# Patient Record
Sex: FEMALE | Race: BLACK OR AFRICAN AMERICAN
[De-identification: names, ages, dates, MRNs, and addresses within clinical notes are randomized per-mention and may not be internally consistent; named-entity substitution may affect disease eponyms.]

---

## 2017-01-26 NOTE — RAD
CT of the head without contrast, 1/26/2017:



History: Panic attack, hallucinations, possible seizure



The ventricles are within normal limits in size. There is no shift of the

midline structures. There is no evidence of acute intracranial hemorrhage or

mass effect.



A small amount of fluid is evident in the left maxillary sinus, presumably on

an inflammatory basis.



IMPRESSION:

1. No acute intracranial abnormality is detected.

2. Left maxillary sinusitis

## 2017-01-26 NOTE — PHYS DOC
Past Medical History


Past Medical History:  Anxiety


Past Surgical History:  Tubal ligation


Alcohol Use:  None


Drug Use:  Marijuana





Adult General


Chief Complaint


Chief Complaint:  ANXIETY/PANIC ATTACK





HPI


HPI


31-year-old female presents with ongoing anxiety as well as chills and possible 

hallucinations per family. She currently denies any hallucinations. She is 

fully alert and oriented and able to follow my commands. She does states she 

was seen at the [place] [facility] several weeks ago and had an extensive 

workup including a CT of her abdomen and pelvis. She was told she had an 

infection at that time but did not follow-up or obtain any prescription for 

antibiotics. She currently denies any chest pain or shortness of breath or 

dysuria. She denies any nausea or vomiting. She states she does not have any 

known health problems. She denies any illicit drug use. She states her last 

menstrual period was approximately 2 weeks ago and normal.





Review of Systems


Review of Systems





Constitutional: Denies fever or chills []


Eyes: Denies change in visual acuity, redness, or eye pain []


HENT: Denies nasal congestion or sore throat []


Respiratory: Denies cough or shortness of breath []


Cardiovascular: No additional information not addressed in HPI []


GI: Denies abdominal pain, nausea, vomiting, bloody stools or diarrhea []


: Denies dysuria or hematuria []


Musculoskeletal: Denies back pain or joint pain []


Integument: Denies rash or skin lesions []


Neurologic: Denies headache, focal weakness or sensory changes []


Endocrine: Denies polyuria or polydipsia []





Current Medications


Current Medications





 Current Medications








 Medications


  (Trade)  Dose


 Ordered  Sig/Clement  Start Time


 Stop Time Status Last Admin


Dose Admin


 


 Lorazepam


  (Ativan)  1 mg  1X  ONCE  1/26/17 16:30


 1/26/17 16:31 DC 1/26/17 16:30


1 MG


 


 Ondansetron HCl


  (Zofran)  4 mg  STK-MED ONCE  1/26/17 16:34


 1/26/17 16:35 DC  


 











Allergies


Allergies





 Allergies








Coded Allergies Type Severity Reaction Last Updated Verified


 


  No Known Drug Allergies    1/26/17 No











Physical Exam


Physical Exam





Constitutional: Well developed, well nourished, no acute distress, non-toxic 

appearance. []


HENT: Normocephalic, atraumatic, bilateral external ears normal, oropharynx 

moist, no oral exudates, nose normal. []


Eyes: PERRLA, EOMI, conjunctiva normal, no discharge. [] 


Neck: Normal range of motion, no tenderness, supple, no stridor. [] 


Cardiovascular:Heart rate regular rhythm, no murmur []


Lungs & Thorax:  Bilateral breath sounds clear to auscultation []


Abdomen: Bowel sounds normal, soft, no tenderness, no masses, no pulsatile 

masses. [] 


Skin: Warm, dry, no erythema, no rash. [] 


Back: No tenderness, no CVA tenderness. [] 


Extremities: No tenderness, no cyanosis, no clubbing, ROM intact, no edema. [] 


Neurologic: Alert and oriented X 3, normal motor function, normal sensory 

function, no focal deficits noted. []


Psychologic: Affect normal, judgement normal, mood normal. []





Current Patient Data


Vital Signs





 Vital Signs








  Date Time  Temp Pulse Resp B/P Pulse Ox O2 Delivery O2 Flow Rate FiO2


 


1/26/17 18:24  86  134/86 99   


 


1/26/17 15:21 98.2  14   Room Air  





 98.2       








Lab Values





 Laboratory Tests








Test


  1/26/17


15:05 1/26/17


15:25


 


Urine Collection Type Unknown   


 


Urine Color Yellow   


 


Urine Clarity Clear   


 


Urine pH 6.0   


 


Urine Specific Gravity 1.025   


 


Urine Protein


  Negativemg/dL


(NEG-TRACE) 


 


 


Urine Glucose (UA)


  Negativemg/dL


(NEG) 


 


 


Urine Ketones (Stick)


  Tracemg/dL


(NEG) 


 


 


Urine Blood


  Negative (NEG)


  


 


 


Urine Nitrite


  Negative (NEG)


  


 


 


Urine Bilirubin


  Negative (NEG)


  


 


 


Urine Urobilinogen Dipstick


  0.2mg/dL (0.2


mg/dL) 


 


 


Urine Leukocyte Esterase


  Negative (NEG)


  


 


 


Urine RBC 0/HPF (0-2)   


 


Urine WBC 0/HPF (0-4)   


 


Urine Squamous Epithelial


Cells Mod/LPF  


  


 


 


Urine Bacteria


  Few/HPF


(0-FEW) 


 


 


Urine Mucus Mod/LPF   


 


Urine Pregnancy Test


  Negative (NEG)


  


 


 


Urine Opiates Screen Neg (NEG)   


 


Urine Methadone Screen Neg (NEG)   


 


Urine Barbiturates Neg (NEG)   


 


Urine Phencyclidine Screen Neg (NEG)   


 


Urine


Amphetamine/Methamphetamine Neg (NEG)  


  


 


 


Urine Benzodiazepines Screen Neg (NEG)   


 


Urine Cocaine Screen Neg (NEG)   


 


Urine Cannabinoids Screen Neg (NEG)   


 


Urine Ethyl Alcohol Neg (NEG)   


 


White Blood Count


  


  5.7x10^3/uL


(4.0-11.0)


 


Red Blood Count


  


  5.02x10^6/uL


(3.50-5.40)


 


Hemoglobin


  


  11.0g/dL


(12.0-15.5)  L


 


Hematocrit


  


  34.8%


(36.0-47.0)  L


 


Mean Corpuscular Volume


  


  69fL ()


L


 


Mean Corpuscular Hemoglobin  22pg (25-35)  L


 


Mean Corpuscular Hemoglobin


Concent 


  32g/dL (31-37)


 


 


Red Cell Distribution Width


  


  18.2%


(11.5-14.5)  H


 


Platelet Count


  


  388x10^3/uL


(140-400)


 


Neutrophils (%) (Auto)  59% (31-73)  


 


Lymphocytes (%) (Auto)  33% (24-48)  


 


Monocytes (%) (Auto)  7% (0-9)  


 


Eosinophils (%) (Auto)  0% (0-3)  


 


Basophils (%) (Auto)  1% (0-3)  


 


Neutrophils # (Auto)


  


  3.3x10^3uL


(1.8-7.7)


 


Lymphocytes # (Auto)


  


  1.9x10^3/uL


(1.0-4.8)


 


Monocytes # (Auto)


  


  0.4x10^3/uL


(0.0-1.1)


 


Eosinophils # (Auto)


  


  0.0x10^3/uL


(0.0-0.7)


 


Basophils # (Auto)


  


  0.1x10^3/uL


(0.0-0.2)


 


Platelet Estimate


  


  Adequate


(ADEQUATE)


 


Giant Platelets  Present  


 


Hypochromasia  Mod  


 


Poikilocytosis  Slight  


 


Anisocytosis  Slight  


 


Microcytosis  Marked  


 


Target Cells  Occ  


 


Ovalocytes  Few  


 


Sodium Level


  


  134mmol/L


(136-145)  L


 


Potassium Level


  


  3.7mmol/L


(3.5-5.1)


 


Chloride Level


  


  102mmol/L


()


 


Carbon Dioxide Level


  


  23mmol/L


(21-32)


 


Anion Gap  9 (6-14)  


 


Blood Urea Nitrogen  7mg/dL (7-20)  


 


Creatinine


  


  0.7mg/dL


(0.6-1.0)


 


Estimated GFR


(Cockcroft-Gault) 


  116.6  


 


 


Glucose Level


  


  102mg/dL


(70-99)  H


 


Calcium Level


  


  9.5mg/dL


(8.5-10.1)





 Laboratory Tests


1/26/17 15:25








 Laboratory Tests


1/26/17 15:25














EKG


EKG


EKG as interpreted by me shows sinus rhythm with a rate of 66 bpm. There are no 

acute ischemic findings seen.





Radiology/Procedures


Radiology/Procedures


CT head without contrast demonstrated the following:


History: Panic attack, hallucinations, possible seizure





The ventricles are within normal limits in size. There is no shift of the


midline structures. There is no evidence of acute intracranial hemorrhage or


mass effect.





A small amount of fluid is evident in the left maxillary sinus, presumably on


an inflammatory basis.





Course & Med Decision Making


Course & Med Decision Making


Pertinent Labs and Imaging studies reviewed. (See chart for details)





This 30-year-old female who is having ongoing anxiety and concern for psychotic 

symptoms has a negative workup at this time for any acute cause. Her head CT 

was also ordered and was negative. Patient was given a dose of IV Ativan and 

some Zofran for nausea. I am currently awaiting family to discuss a treatment 

plan for her. I also discussed the case with Dr. Greer the hospitalist who 

agreed that the patient could be brought in under observation care for her 

ongoing hallucinatory symptoms if she is not comfortable going home.





Upon my reassessment, the patient feels improved after Ativan dose. An 

observation stay for her ongoing anxiety symptoms was offered but was declined. 

She continues to deny any suicidal or homicidal ideation. I discussed the need 

for the patient to obtain follow-up and resources were provided to [facility] as well as multiple family practice clinics. Family at bedside 

states they will be very good about her getting follow-up in the next few days 

for her symptoms. A prescription for a few days of Ativan was also provided. She

'll be discharged without incident.





Dragon Disclaimer


Dragon Disclaimer


This electronic medical record was generated, in whole or in part, using a 

voice recognition dictation system.





Departure


Departure


Impression:  


 Primary Impression:  


 Anxiety


Disposition:  01 HOME, SELF-CARE


Admitting Physician:  Other


Referrals:  


NO PCP (PCP)








JOE GALVAN MD


Patient Instructions:  Anxiety and Panic Attacks, Easy-to-Read





Additional Instructions:


Please take your medication as prescribed and utilize the resources provided. 

Follow up with your primary doctor as discussed in the next 1-2 days. Return to 

the ER if you develop any worsening of your symptoms.


Scripts


Ondansetron Hcl (Zofran)4 Mg Tablet1 Tab PO Q8HRS #10 TAB


   Prov:LUDIN DAWN DO         1/26/17


Lorazepam (Ativan)1 Mg Tablet1 Mg PO BID #6 TAB


   Prov:LUDIN DAWN DO         1/26/17








LUDIN DAWN DO Jan 26, 2017 17:20

## 2017-01-26 NOTE — EKG
[facility]

              [address] [place] [address]

Test Date:    2017               Test Time:    15:13:10

Pat Name:     BREANNA SALOMON             Department:   

Patient ID:   [id number]           Room:          

Gender:       F                        Technician:   

:          [id number]               Requested By: LUDIN DAWN

Order Number: [id number]            Reading MD:   Sandor Holley

                                 Measurements

Intervals                              Axis          

Rate:         66                       P:            -20

WY:           158                      QRS:          25

QRSD:         78                       T:            20

QT:           390                                    

QTc:          411                                    

                           Interpretive Statements

SINUS RHYTHM



Electronically Signed On 2017 10:07:20 CST by Sandor Holley

## 2017-02-09 NOTE — PHYS DOC
Past Medical History


Past Medical History:  Anxiety


Past Surgical History:  Tubal ligation


Additional Past Surgical Histo:  hernia repair


Alcohol Use:  None


Drug Use:  Marijuana





Adult General


Chief Complaint


Chief Complaint:  ANXIETY/PANIC ATTACK





HPI


HPI


Patient is a 31  year old female presents emergency room via EMS today with 2 

complaints: 1. Anxiety which causes her to have feelings of dread and fear. 

Patient reports that she's had problems with anxiety for several years. She was 

seen here January 26 for the same complaint. She has a has an appointment at 

the end of this month with [facility]. Patient states that she cannot 

wait that long. Patient was prescribed Ativan upon her discharge from here. She 

states that the Ativan did very little to control her anxiety. Patient denies 

suicidal or homicidal ideation. Patient states that she is not being verbally, 

mostly or physically abused in her home.


2. Patient has an additional complaint of nausea, vomiting and diarrhea that 

began earlier today. Patient reports that she's thrown up 3 times and had 4 

loose bowel movements today. She denies any bilious or black emesis. She denies 

any black or bloody bowel movements. Patient denies any one in the home with 

similar symptoms. She denies hospitalization, foreign travel or antibiotic use 

within the past 90 days. Patient denies any history of gastrointestinal 

diseases. She's had a tubal ligation and abdominal wall hernia repair performed 

in the past. She denies any history of bowel obstructions. Last by mouth intake 

was approximately 10 or 11 PM last night. Last bowel movement was approximately 

2 hours prior to arrival to the hospital.





Patient reports chills at home. She denies fevers. She also reports that she's 

had a nonproductive cough for 3 days. Patient is a smoker. She denies illicit 

drug use.





Review of Systems


Review of Systems





Constitutional: Denies fever or chills []


Eyes: Denies change in visual acuity, redness, or eye pain []


HENT: Denies nasal congestion or sore throat []


Respiratory: Denies cough or shortness of breath []


Cardiovascular: No additional information not addressed in HPI []


GI: Denies abdominal pain, nausea, vomiting, bloody stools or diarrhea []


: Denies dysuria or hematuria []


Musculoskeletal: Denies back pain or joint pain []


Integument: Denies rash or skin lesions []


Neurologic: Denies headache, focal weakness or sensory changes []


Endocrine: Denies polyuria or polydipsia []





Current Medications


Current Medications





 Current Medications








 Medications


  (Trade)  Dose


 Ordered  Sig/Clement  Start Time


 Stop Time Status Last Admin


Dose Admin


 


 Diphenhydramine


 HCl


  (Benadryl)  25 mg  1X  ONCE  2/9/17 18:15


 2/9/17 18:18 DC 2/9/17 19:21


25 MG


 


 Haloperidol


 Lactate


  (Haldol)  5 mg  1X  ONCE  2/9/17 18:15


 2/9/17 18:18 DC 2/9/17 19:22


5 MG


 


 Metoclopramide HCl


  (Reglan)  10 mg  1X  ONCE  2/9/17 18:15


 2/9/17 18:18 DC 2/9/17 19:19


10 MG


 


 Sodium Chloride


  (Iv Sodium


 Chloride 0.9%


 1000ml Bag)  1,000 ml @ 


 1,000 mls/hr  Q1H  2/9/17 18:13


 2/9/17 19:12 DC 2/9/17 19:19


1,000 MLS/HR











Allergies


Allergies





 Allergies








Coded Allergies Type Severity Reaction Last Updated Verified


 


  No Known Drug Allergies    1/26/17 No











Physical Exam


Physical Exam





Constitutional: Well developed, well nourished, moderate distress, non-toxic 

appearance. Patient is tearful. Patient states that she is concerned that she 

may pass out.


HENT: Normocephalic, atraumatic, bilateral external ears normal, oropharynx 

moist, no oral exudates, nose normal. []


Eyes: PERRLA, EOMI, conjunctiva normal, no discharge. [] 


Neck: Normal range of motion, no tenderness, supple, no stridor. There is no 

meningismus. There is bilateral anterior and posterior cervical 

lymphadenopathy. 


Cardiovascular:Heart rate 104 with regular rhythm, no murmur 


Lungs & Thorax:  Respiratory distress or respiratory fatigue. Patient is not 

tachypneic or hypoxic. Patient is able to speak in full sentences. Lung sounds 

are clear to auscultation bilaterally.


Abdomen: Abdomen is soft and nondistended. There are hyperactive bowel sounds 

in all 4 quadrants. There is no focal tenderness to the abdomen or palpable 

defects the abdominal wall. There are no pulsatile masses. There is no rebound 

or guarding.


Skin: Warm, dry, no erythema, no rash. 


Back: No tenderness, no CVA tenderness. [] 


Extremities: No tenderness, no cyanosis, no clubbing, ROM intact, no edema. [] 


Neurologic: Alert and oriented X 3, normal motor function, normal sensory 

function, no focal deficits noted. 


Psychologic: Patient is anxious. I was able to redirect the patient by 

reminding her that she was in a safe place and there was no immediate threat to 

life, limb her eyesight.





Current Patient Data


Vital Signs





 Vital Signs








  Date Time  Temp Pulse Resp B/P Pulse Ox O2 Delivery O2 Flow Rate FiO2


 


2/9/17 17:54 97.3 71 16 127/60 97 Room Air  





 97.3       








Lab Values





 Laboratory Tests








Test


  2/9/17


17:50 2/9/17


19:12


 


Urine Collection Type Clean catch   


 


Urine Color Yellow   


 


Urine Clarity Clear   


 


Urine pH 6.0   


 


Urine Specific Gravity 1.010   


 


Urine Protein


  Negativemg/dL


(NEG-TRACE) 


 


 


Urine Glucose (UA)


  Negativemg/dL


(NEG) 


 


 


Urine Ketones (Stick)


  Negativemg/dL


(NEG) 


 


 


Urine Blood


  Negative (NEG)


  


 


 


Urine Nitrite


  Negative (NEG)


  


 


 


Urine Bilirubin


  Negative (NEG)


  


 


 


Urine Urobilinogen Dipstick


  0.2mg/dL (0.2


mg/dL) 


 


 


Urine Leukocyte Esterase


  Negative (NEG)


  


 


 


Urine RBC 0/HPF (0-2)   


 


Urine WBC 0/HPF (0-4)   


 


Urine Squamous Epithelial


Cells Few/LPF  


  


 


 


Urine Bacteria 0/HPF (0-FEW)   


 


Urine Mucus Mod/LPF   


 


Urine Opiates Screen Neg (NEG)   


 


Urine Methadone Screen Neg (NEG)   


 


Urine Barbiturates Neg (NEG)   


 


Urine Phencyclidine Screen Neg (NEG)   


 


Urine


Amphetamine/Methamphetamine Neg (NEG)  


  


 


 


Urine Benzodiazepines Screen Neg (NEG)   


 


Urine Cocaine Screen Neg (NEG)   


 


Urine Cannabinoids Screen Neg (NEG)   


 


Urine Ethyl Alcohol Neg (NEG)   


 


White Blood Count


  


  6.9x10^3/uL


(4.0-11.0)


 


Red Blood Count


  


  5.00x10^6/uL


(3.50-5.40)


 


Hemoglobin


  


  11.0g/dL


(12.0-15.5)  L


 


Hematocrit


  


  34.8%


(36.0-47.0)  L


 


Mean Corpuscular Volume


  


  70fL ()


L


 


Mean Corpuscular Hemoglobin  22pg (25-35)  L


 


Mean Corpuscular Hemoglobin


Concent 


  32g/dL (31-37)


 


 


Red Cell Distribution Width


  


  18.7%


(11.5-14.5)  H


 


Platelet Count


  


  352x10^3/uL


(140-400)


 


Neutrophils (%) (Auto)  72% (31-73)  


 


Lymphocytes (%) (Auto)  23% (24-48)  L


 


Monocytes (%) (Auto)  3% (0-9)  


 


Eosinophils (%) (Auto)  0% (0-3)  


 


Basophils (%) (Auto)  1% (0-3)  


 


Neutrophils # (Auto)


  


  5.0x10^3uL


(1.8-7.7)


 


Lymphocytes # (Auto)


  


  1.6x10^3/uL


(1.0-4.8)


 


Monocytes # (Auto)


  


  0.2x10^3/uL


(0.0-1.1)


 


Eosinophils # (Auto)


  


  0.0x10^3/uL


(0.0-0.7)


 


Basophils # (Auto)


  


  0.1x10^3/uL


(0.0-0.2)


 


Platelet Estimate  Pending  


 


Sodium Level


  


  140mmol/L


(136-145)


 


Potassium Level


  


  3.6mmol/L


(3.5-5.1)


 


Chloride Level


  


  104mmol/L


()


 


Carbon Dioxide Level


  


  26mmol/L


(21-32)


 


Anion Gap  10 (6-14)  


 


Blood Urea Nitrogen


  


  5mg/dL (7-20)


L


 


Creatinine


  


  0.7mg/dL


(0.6-1.0)


 


Estimated GFR


(Cockcroft-Gault) 


  116.6  


 


 


BUN/Creatinine Ratio  7 (6-20)  


 


Glucose Level


  


  99mg/dL


(70-99)


 


Calcium Level


  


  9.8mg/dL


(8.5-10.1)


 


Total Bilirubin


  


  0.3mg/dL


(0.2-1.0)


 


Aspartate Amino Transferase


(AST) 


  20U/L (15-37)  


 


 


Alanine Aminotransferase (ALT)  16U/L (14-59)  


 


Alkaline Phosphatase


  


  78U/L ()


 


 


Total Protein


  


  8.8g/dL


(6.4-8.2)  H


 


Albumin


  


  4.0g/dL


(3.4-5.0)


 


Albumin/Globulin Ratio


  


  0.8 (1.0-1.7)


L


 


Lipase


  


  107U/L


()





 Laboratory Tests


2/9/17 19:12








 Laboratory Tests


2/9/17 19:12











EKG


EKG


[]





Radiology/Procedures


Radiology/Procedures


[]





Course & Med Decision Making


Course & Med Decision Making


Pertinent Labs and Imaging studies reviewed. (See chart for details)





[]





Dragon Disclaimer


Dragon Disclaimer


This electronic medical record was generated, in whole or in part, using a 

voice recognition dictation system.





Departure


Departure


Impression:  


 Primary Impression:  


 Anxiety


 Additional Impression:  


 Gastroenteritis


Disposition:  01 HOME, SELF-CARE


Condition:  IMPROVED


Referrals:  


NO PCP (PCP)


Patient Instructions:  Anxiety and Panic Attacks, Easy-to-Read, Viral 

Gastroenteritis, Easy-to-Read





Additional Instructions:


1. Your laboratory tests today are normal.


2. Take the medication as prescribed.


3. Review the discharge instructions for home/self-care and reasons to return 

the emergency department.


4. Be sure you make your appointment with [facility] and also 

establish a new primary care doctor. You can use the pamphlet provided for this.


Scripts


Ondansetron (Zofran Odt)4 Mg Tab.rapdis1 Tab SL Q8HRS #15 TAB


   Prov:LUANA DALE         2/9/17


Hydroxyzine Hcl 25 Mg Tablet1 Tab PO TID ANXIETY #30 TAB


   Prov:LUANA DALE         2/9/17





Problem Qualifiers








LUANA DALE Feb 9, 2017 18:28

## 2017-11-25 ENCOUNTER — HOSPITAL ENCOUNTER (EMERGENCY)
Dept: HOSPITAL 61 - ER | Age: 32
Discharge: HOME | End: 2017-11-25
Payer: SELF-PAY

## 2017-11-25 VITALS — DIASTOLIC BLOOD PRESSURE: 64 MMHG | SYSTOLIC BLOOD PRESSURE: 119 MMHG

## 2017-11-25 VITALS — BODY MASS INDEX: 39.27 KG/M2 | HEIGHT: 60 IN | WEIGHT: 200 LBS

## 2017-11-25 DIAGNOSIS — N64.4: Primary | ICD-10-CM

## 2017-11-25 PROCEDURE — 99283 EMERGENCY DEPT VISIT LOW MDM: CPT

## 2017-11-25 NOTE — PHYS DOC
Past Medical History


Past Medical History:  Anxiety


Past Surgical History:  Tubal ligation


Additional Past Surgical Histo:  hernia repair


Alcohol Use:  None


Drug Use:  Marijuana





Adult General


Chief Complaint


Chief Complaint:  BREAST PROBLEM





HPI


HPI





Patient is a 32  year old female presents to the ED complaining of right breast 

pain x 3 weeks. Describes as achy. Rates as 6/10. States same symptoms a few 

months ago that resolved without intervention. States pain improved with 

tramadol. No history of breast cancer in family. States she has never had a 

mammogram. Denies breast skin changes, nipple discharge, weakness, chest pain, 

shortness of breath, dizziness, fever or masses.





Review of Systems


Review of Systems





Constitutional: Denies fever or chills []


Eyes: Denies change in visual acuity, redness, or eye pain []


HENT: Denies nasal congestion or sore throat []


Respiratory: Denies cough or shortness of breath []


Cardiovascular: No additional information not addressed in HPI []


GI: Denies abdominal pain, nausea, vomiting, bloody stools or diarrhea []


: Denies dysuria or hematuria []


Musculoskeletal: Denies back pain or joint pain []


Integument: Denies rash or skin lesions []


Neurologic: Denies headache, focal weakness or sensory changes []


Endocrine: Denies polyuria or polydipsia []





All other systems were reviewed and found to be within normal limits, except as 

documented in this note.





Allergies


Allergies





Allergies








Coded Allergies Type Severity Reaction Last Updated Verified


 


  No Known Drug Allergies    1/26/17 No











Physical Exam


Physical Exam





Constitutional: Well developed, well nourished, no acute distress, non-toxic 

appearance. []


HENT: Normocephalic, atraumatic, bilateral external ears normal, oropharynx 

moist, no oral exudates, nose normal. []


Eyes: PERRLA, EOMI, conjunctiva normal, no discharge. [] 


Neck: Normal range of motion, no tenderness, supple, no stridor. [] 


Cardiovascular:Heart rate regular rhythm, no murmur []


Lungs & Thorax:  Bilateral breath sounds clear to auscultation [] NORMAL BREAST 

EXAM. NO OVERLYING SKIN CHANGES, NIPPLE DISCHARGE, LUMPS OR MASSES. 


Abdomen: Bowel sounds normal, soft, no tenderness, no masses, no pulsatile 

masses. [] 


Skin: Warm, dry, no erythema, no rash. [] 


Back: No tenderness, no CVA tenderness. [] 


Extremities: No tenderness, no cyanosis, no clubbing, ROM intact, no edema. [] 


Neurologic: Alert and oriented X 3, normal motor function, normal sensory 

function, no focal deficits noted. []


Psychologic: Affect normal, judgement normal, mood normal. []





Current Patient Data


Vital Signs





 Vital Signs








  Date Time  Temp Pulse Resp B/P (MAP) Pulse Ox O2 Delivery O2 Flow Rate FiO2


 


11/25/17 17:15 98.1 95 16  99 Room Air  





 98.1       











EKG


EKG


[]





Radiology/Procedures


Radiology/Procedures


[]





Course & Med Decision Making


Course & Med Decision Making


Pertinent Labs and Imaging studies reviewed. (See chart for details)





[]Normal breast exam. Overlying skin changes. Provided community resource list 

for follow-up mammogram and evaluation. Discussed reasons to return to the ED. 

Patient understands and agrees with plan.





Dragon Disclaimer


Dragon Disclaimer


This electronic medical record was generated, in whole or in part, using a 

voice recognition dictation system.





Departure


Departure


Impression:  


 Primary Impression:  


 Breast pain


Disposition:  01 HOME, SELF-CARE


Condition:  STABLE


Referrals:  


NO PCP (PCP)








SHELL SPRAGUE Jr, MD


Patient Instructions:  Breast Self-Exam, Easy-to-Read, Breast Tenderness


Scripts


Tramadol Hcl (TRAMADOL HCL) 50 Mg Tablet


1 TAB PO PRN Q6HRS, #12 TAB


   Prov: DERRICK BENTON         11/25/17











DERRICK BENTON Nov 25, 2017 17:25

## 2018-01-29 ENCOUNTER — HOSPITAL ENCOUNTER (EMERGENCY)
Dept: HOSPITAL 61 - ER | Age: 33
Discharge: HOME | End: 2018-01-29
Payer: SELF-PAY

## 2018-01-29 DIAGNOSIS — J45.909: ICD-10-CM

## 2018-01-29 DIAGNOSIS — M79.1: ICD-10-CM

## 2018-01-29 DIAGNOSIS — F12.10: ICD-10-CM

## 2018-01-29 DIAGNOSIS — R05: ICD-10-CM

## 2018-01-29 DIAGNOSIS — N64.4: Primary | ICD-10-CM

## 2018-01-29 DIAGNOSIS — R51: ICD-10-CM

## 2018-01-29 LAB
AMORPHOUS SEDIMENT,UR: PRESENT /HPF
BACTERIA,URINE: 0 /HPF
BILIRUBIN,URINE: NEGATIVE
CLARITY,URINE: CLEAR
COLOR,URINE: YELLOW
GLUCOSE,URINE: NEGATIVE MG/DL
INFLUENZA A PATIENT: NEGATIVE
INFLUENZA B PATIENT: NEGATIVE
NITRITE,URINE: NEGATIVE
OBC FLU: (no result)
PH,URINE: 6
PROTEIN,URINE: NEGATIVE MG/DL
RBC,URINE: (no result) /HPF (ref 0–2)
SPECIFIC GRAVITY,URINE: 1.02
SQUAMOUS EPITHELIAL CELL,UR: (no result) /LPF
URINE HCG POC: (no result)
UROBILINOGEN,URINE: 0.2 MG/DL
WBC,URINE: 0 /HPF (ref 0–4)

## 2018-01-29 PROCEDURE — 81025 URINE PREGNANCY TEST: CPT

## 2018-01-29 PROCEDURE — 81001 URINALYSIS AUTO W/SCOPE: CPT

## 2018-01-29 PROCEDURE — 71045 X-RAY EXAM CHEST 1 VIEW: CPT

## 2018-01-29 PROCEDURE — 99285 EMERGENCY DEPT VISIT HI MDM: CPT

## 2018-01-29 PROCEDURE — 87804 INFLUENZA ASSAY W/OPTIC: CPT

## 2018-11-21 ENCOUNTER — HOSPITAL ENCOUNTER (EMERGENCY)
Dept: HOSPITAL 61 - ER | Age: 33
Discharge: HOME | End: 2018-11-21
Payer: SELF-PAY

## 2018-11-21 VITALS — SYSTOLIC BLOOD PRESSURE: 115 MMHG | DIASTOLIC BLOOD PRESSURE: 61 MMHG

## 2018-11-21 VITALS — HEIGHT: 60 IN | WEIGHT: 180 LBS | BODY MASS INDEX: 35.34 KG/M2

## 2018-11-21 DIAGNOSIS — K02.9: Primary | ICD-10-CM

## 2018-11-21 DIAGNOSIS — J45.909: ICD-10-CM

## 2018-11-21 PROCEDURE — 99283 EMERGENCY DEPT VISIT LOW MDM: CPT

## 2018-11-21 NOTE — PHYS DOC
Past Medical History


Past Medical History:  Asthma, Other


Additional Past Medical Histor:  LUMP TO BILATERAL BREASTS


Past Surgical History:  Other


Additional Past Surgical Histo:  HERNIA REPAIR


Alcohol Use:  None


Drug Use:  Marijuana





Adult General


Chief Complaint


Chief Complaint:  DENTAL PROBLEM





HPI


HPI





Patient is a 33  year old AA female who presents to the ER with complaints of 

left lower dental pain for the last 3 days. She denies any fever, sore throat, 

difficulty swallowing,  nausea, vomiting, or known injury. She states that she 

has known dental decay and is planning to see a dentist but does not have a 

dentist yet. Currently, her pain is a 10/10, she has not taken anything for 

relief of the pain. .





Review of Systems


Review of Systems





Constitutional: Denies fever or chills []


HENT: Denies nasal congestion, ear pain, or sore throat, see HPI[]


Respiratory: Denies cough or shortness of breath []


Integument: Denies rash or skin lesions []


Neurologic: Denies headache, focal weakness or sensory changes []








All other systems were reviewed and found to be within normal limits, except as 

documented in this note.





Current Medications


Current Medications





Current Medications








 Medications


  (Trade)  Dose


 Ordered  Sig/Clement  Start Time


 Stop Time Status Last Admin


Dose Admin


 


 Acetaminophen/


 Hydrocodone Bitart


  (Lortab 5/325)  1 tab  1X  ONCE  11/21/18 18:30


 11/21/18 18:32 DC  





 


 Naproxen


  (Naprosyn)  500 mg  1X  STAT  11/21/18 18:31


 11/21/18 18:32 UNV  














Allergies


Allergies





Allergies








Coded Allergies Type Severity Reaction Last Updated Verified


 


  No Known Drug Allergies    1/26/17 No











Physical Exam


Physical Exam





Constitutional: Well developed, well nourished, no acute distress, non-toxic 

appearance. []


HENT: Normocephalic, atraumatic, bilateral external ears normal, oropharynx 

moist, no oral exudates, nose normal; diffuse dental decay present in LLQ with 

gingival erythema no visible abscess. []


Eyes: PERRLA, conjunctiva normal, no discharge. [] 


Neck: Normal range of motion, no tenderness, supple, no stridor. [] 


Skin: Warm, dry, no erythema, no rash. [] 


Neurologic: Alert and oriented X 3, normal motor function, normal sensory 

function, no focal deficits noted. []


Psychologic: Affect normal, judgement normal, mood normal. []





Current Patient Data


Vital Signs





 Vital Signs








  Date Time  Temp Pulse Resp B/P (MAP) Pulse Ox O2 Delivery O2 Flow Rate FiO2


 


11/21/18 18:05 98.0 100 18 115/61 (79) 100 Room Air  





 98.0       











EKG


EKG


[]





Radiology/Procedures


Radiology/Procedures


[]





Course & Med Decision Making


Course & Med Decision Making


Pertinent Labs and Imaging studies reviewed. (See chart for details)


Dx: dentalgia with infected dental caries. 


Prescription for Penicillin VK. PT given a naproxen in the ER. Tylenol or 

ibuprofen as needed for pain. Follow up with a dentist using the referral list 

provided. Return to the ER if symptoms worsen. Patient verbalized an 

understanding of home care, medications, follow-up, and return to ED 

instructions and was in agreement with the plan of care.


[]





Dragon Disclaimer


Dragon Disclaimer


This electronic medical record was generated, in whole or in part, using a 

voice recognition dictation system.





Departure


Departure


Impression:  


 Primary Impression:  


 Dentalgia


 Additional Impression:  


 Infected dental caries


Disposition:  01 HOME, SELF-CARE


Condition:  STABLE


Referrals:  


NO PCP (PCP)


Patient Instructions:  Dental Abscess





Additional Instructions:  


Fill prescription and use as directed. Take Tylenol or ibuprofen as needed for 

pain. Follow up with a dentist using the referral list provided. Return to the 

ER if symptoms worsen.


Scripts


Penicillin V Potassium (PENICILLIN V POTASSIUM) 500 Mg Tablet


1 TAB PO QID for 10 Days, #40 TAB 0 Refills


   Prov: HAMIDA SANCHEZ         11/21/18





Problem Qualifiers











HAMIDA SANCHEZ Nov 21, 2018 18:40

## 2019-09-10 ENCOUNTER — HOSPITAL ENCOUNTER (EMERGENCY)
Dept: HOSPITAL 61 - ER | Age: 34
Discharge: HOME | End: 2019-09-10
Payer: SELF-PAY

## 2019-09-10 VITALS — SYSTOLIC BLOOD PRESSURE: 133 MMHG | DIASTOLIC BLOOD PRESSURE: 63 MMHG

## 2019-09-10 VITALS — BODY MASS INDEX: 34.95 KG/M2 | HEIGHT: 60 IN | WEIGHT: 178 LBS

## 2019-09-10 DIAGNOSIS — J45.21: Primary | ICD-10-CM

## 2019-09-10 PROCEDURE — 99284 EMERGENCY DEPT VISIT MOD MDM: CPT

## 2019-09-10 PROCEDURE — 71046 X-RAY EXAM CHEST 2 VIEWS: CPT

## 2019-09-10 NOTE — PHYS DOC
Past Medical History


Past Medical History:  Asthma, Other


Additional Past Medical Histor:  LUMP TO BILATERAL BREASTS


Past Surgical History:  Other


Additional Past Surgical Histo:  HERNIA REPAIR


Alcohol Use:  None


Drug Use:  Marijuana





Adult General


Chief Complaint


Chief Complaint:  ASTHMA





HPI


HPI





Patient is a 34  year old [f__sex] who presents with []





Review of Systems


Review of Systems





Constitutional: Denies fever or chills []


Eyes: Denies change in visual acuity, redness, or eye pain []


HENT: Denies nasal congestion or sore throat []


Respiratory: Denies cough or shortness of breath []


Cardiovascular: No additional information not addressed in HPI []


GI: Denies abdominal pain, nausea, vomiting, bloody stools or diarrhea []


: Denies dysuria or hematuria []


Musculoskeletal: Denies back pain or joint pain []


Integument: Denies rash or skin lesions []


Neurologic: Denies headache, focal weakness or sensory changes []


Endocrine: Denies polyuria or polydipsia []





All other systems were reviewed and found to be within normal limits, except as 

documented in this note.





Current Medications


Current Medications





Current Medications








 Medications


  (Trade)  Dose


 Ordered  Sig/Clement  Start Time


 Stop Time Status Last Admin


Dose Admin


 


 Albuterol/


 Ipratropium


  (Duoneb)  3 ml  STK-MED ONCE  9/10/19 01:23


 9/10/19 01:24 DC  





 


 Dexamethasone


  (Decadron)  10 mg  1X  ONCE  9/10/19 01:15


 9/10/19 01:16 DC 9/10/19 01:17


10 MG











Allergies


Allergies





Allergies








Coded Allergies Type Severity Reaction Last Updated Verified


 


  No Known Drug Allergies    1/26/17 No











Physical Exam


Physical Exam





Constitutional: Well developed, well nourished, no acute distress, non-toxic 

appearance. []


HENT: Normocephalic, atraumatic, bilateral external ears normal, oropharynx 

moist, no oral exudates, nose normal. []


Eyes: PERRLA, EOMI, conjunctiva normal, no discharge. [] 


Neck: Normal range of motion, no tenderness, supple, no stridor. [] 


Cardiovascular:Heart rate regular rhythm, no murmur []


Lungs & Thorax:  Bilateral breath sounds clear to auscultation []


Abdomen: Bowel sounds normal, soft, no tenderness, no masses, no pulsatile 

masses. [] 


Skin: Warm, dry, no erythema, no rash. [] 


Back: No tenderness, no CVA tenderness. [] 


Extremities: No tenderness, no cyanosis, no clubbing, ROM intact, no edema. [] 


Neurologic: Alert and oriented X 3, normal motor function, normal sensory 

function, no focal deficits noted. []


Psychologic: Affect normal, judgement normal, mood normal. []





Current Patient Data


Vital Signs





                                   Vital Signs








  Date Time  Temp Pulse Resp B/P (MAP) Pulse Ox O2 Delivery O2 Flow Rate FiO2


 


9/10/19 00:48 98.1 74 17 133/63 (86) 98 Room Air  





 98.1       











EKG


EKG


[]





Radiology/Procedures


Radiology/Procedures


[]





Course & Med Decision Making


Course & Med Decision Making


Pertinent Labs and Imaging studies reviewed. (See chart for details)





[]





Dragon Disclaimer


Dragon Disclaimer


This electronic medical record was generated, in whole or in part, using a voice

 recognition dictation system.





Departure


Departure


Impression:  


   Primary Impression:  


   Asthmatic bronchitis


Disposition:  01 HOME, SELF-CARE


Condition:  STABLE


Referrals:  


NO PCP (PCP)


Patient Instructions:  Acute Bronchitis, Easy-to-Read


Scripts


Benzonatate (TESSALON PERLE) 100 Mg Capsule


1 CAP PO TID PRN for COUGH, #30 CAP


   Prov: ANNETTE SHEEHAN DO         9/10/19 


Prednisone (PREDNISONE) 20 Mg Tablet


2 TAB PO DAILY, #8 TAB


   Prov: ANNETTE SHEEHAN DO         9/10/19 


Albuterol Sulfate (Proair Hfa) 8.5 Gm Hfa.aer.ad


1 PUFF INH PRN Q6HRS PRN for WHEEZING, #1 INHALER


   Prov: ANNETTE SHEEHAN DO         9/10/19





Problem Qualifiers








   Primary Impression:  


   Asthmatic bronchitis


   Asthma severity:  mild  Asthma persistence:  intermittent  Asthma 

   complication type:  with acute exacerbation  Qualified Codes:  J45.21 - Mild 

   intermittent asthma with (acute) exacerbation








ANNETTE SHEEHAN DO             Sep 10, 2019 01:33

## 2019-09-10 NOTE — RAD
CHEST PA   LATERAL 

 

INDICATION: Dyspnea. 

 

COMPARISON STUDY: 1/29/2018.

 

FINDINGS:

 

Lungs: Normal lung volume. No pulmonary mass or consolidation. The 

tracheobronchial tree and hilar structures are normal.

 

Pleura: No pleural effusion or pneumothorax.

 

Heart and Mediastinum: The cardiomediastinal silhouette is normal. The 

great vessels of the thorax are normal.

 

Bones and Soft Tissues: The bones and soft tissues are within normal 

limits.

 

IMPRESSION:  

No acute cardiopulmonary process.

 

Electronically signed by: Romel Wesley MD (9/10/2019 5:17 AM) [facility]

## 2020-03-31 ENCOUNTER — HOSPITAL ENCOUNTER (EMERGENCY)
Dept: HOSPITAL 61 - ER | Age: 35
Discharge: HOME | End: 2020-03-31
Payer: SELF-PAY

## 2020-03-31 VITALS
SYSTOLIC BLOOD PRESSURE: 115 MMHG | SYSTOLIC BLOOD PRESSURE: 115 MMHG | DIASTOLIC BLOOD PRESSURE: 68 MMHG | DIASTOLIC BLOOD PRESSURE: 68 MMHG

## 2020-03-31 VITALS — BODY MASS INDEX: 43.28 KG/M2 | HEIGHT: 60 IN | WEIGHT: 220.46 LBS

## 2020-03-31 DIAGNOSIS — Z87.891: ICD-10-CM

## 2020-03-31 DIAGNOSIS — F12.90: ICD-10-CM

## 2020-03-31 DIAGNOSIS — Z88.6: ICD-10-CM

## 2020-03-31 DIAGNOSIS — R11.2: ICD-10-CM
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
## 2020-03-31 DIAGNOSIS — T78.49XA: Primary | ICD-10-CM

## 2020-03-31 DIAGNOSIS — R05: ICD-10-CM

## 2020-03-31 DIAGNOSIS — R10.84: ICD-10-CM

## 2020-03-31 DIAGNOSIS — Z98.890: ICD-10-CM

## 2020-03-31 DIAGNOSIS — J45.909: ICD-10-CM

## 2020-03-31 DIAGNOSIS — R50.9: ICD-10-CM

## 2020-03-31 LAB
% LYMPHS: 55 % (ref 24–48)
% MONOS: 11 % (ref 0–10)
% SEGS: 34 % (ref 35–66)
ALBUMIN SERPL-MCNC: 3.4 G/DL (ref 3.4–5)
ALBUMIN/GLOB SERPL: 0.9 {RATIO} (ref 1–1.7)
ALP SERPL-CCNC: 88 U/L (ref 46–116)
ALT SERPL-CCNC: 27 U/L (ref 14–59)
ANION GAP SERPL CALC-SCNC: 13 MMOL/L (ref 6–14)
ANISOCYTOSIS BLD QL SMEAR: SLIGHT
APTT PPP: YELLOW S
AST SERPL-CCNC: 18 U/L (ref 15–37)
BACTERIA #/AREA URNS HPF: 0 /HPF
BASOPHILS # BLD AUTO: 0.1 X10^3/UL (ref 0–0.2)
BASOPHILS NFR BLD: 1 % (ref 0–3)
BILIRUB SERPL-MCNC: 0.1 MG/DL (ref 0.2–1)
BILIRUB UR QL STRIP: NEGATIVE
BUN SERPL-MCNC: 7 MG/DL (ref 7–20)
BUN/CREAT SERPL: 9 (ref 6–20)
CALCIUM SERPL-MCNC: 9.2 MG/DL (ref 8.5–10.1)
CHLORIDE SERPL-SCNC: 98 MMOL/L (ref 98–107)
CK SERPL-CCNC: 193 U/L (ref 26–192)
CO2 SERPL-SCNC: 23 MMOL/L (ref 21–32)
CREAT SERPL-MCNC: 0.8 MG/DL (ref 0.6–1)
EOSINOPHIL NFR BLD: 0.1 X10^3/UL (ref 0–0.7)
EOSINOPHIL NFR BLD: 1 % (ref 0–3)
ERYTHROCYTE [DISTWIDTH] IN BLOOD BY AUTOMATED COUNT: 19 % (ref 11.5–14.5)
FIBRINOGEN PPP-MCNC: CLEAR MG/DL
GFR SERPLBLD BASED ON 1.73 SQ M-ARVRAT: 98.2 ML/MIN
GLOBULIN SER-MCNC: 4 G/DL (ref 2.2–3.8)
GLUCOSE SERPL-MCNC: 151 MG/DL (ref 70–99)
HCT VFR BLD CALC: 26.3 % (ref 36–47)
HGB BLD-MCNC: 8.3 G/DL (ref 12–15.5)
HYPOCHROMIA BLD QL SMEAR: (no result)
LIPASE: 70 U/L (ref 73–393)
LYMPHOCYTES # BLD: 4.1 X10^3/UL (ref 1–4.8)
LYMPHOCYTES NFR BLD AUTO: 57 % (ref 24–48)
MCH RBC QN AUTO: 19 PG (ref 25–35)
MCHC RBC AUTO-ENTMCNC: 32 G/DL (ref 31–37)
MCV RBC AUTO: 61 FL (ref 79–100)
MICROCYTES BLD QL SMEAR: (no result)
MONO #: 0.7 X10^3/UL (ref 0–1.1)
MONOCYTES NFR BLD: 9 % (ref 0–9)
NEUT #: 2.3 X10^3/UL (ref 1.8–7.7)
NEUTROPHILS NFR BLD AUTO: 32 % (ref 31–73)
NITRITE UR QL STRIP: NEGATIVE
PH UR STRIP: 6 [PH]
PLATELET # BLD AUTO: 471 X10^3/UL (ref 140–400)
PLATELET # BLD EST: (no result) 10*3/UL
POLYCHROMASIA BLD QL SMEAR: SLIGHT
POTASSIUM SERPL-SCNC: 3.3 MMOL/L (ref 3.5–5.1)
PROT SERPL-MCNC: 7.4 G/DL (ref 6.4–8.2)
PROT UR STRIP-MCNC: NEGATIVE MG/DL
RBC # BLD AUTO: 4.31 X10^6/UL (ref 3.5–5.4)
RBC #/AREA URNS HPF: (no result) /HPF (ref 0–2)
SODIUM SERPL-SCNC: 134 MMOL/L (ref 136–145)
SQUAMOUS #/AREA URNS LPF: (no result) /LPF
UROBILINOGEN UR-MCNC: 1 MG/DL
WBC # BLD AUTO: 7.2 X10^3/UL (ref 4–11)
WBC #/AREA URNS HPF: 0 /HPF (ref 0–4)

## 2020-03-31 PROCEDURE — 81001 URINALYSIS AUTO W/SCOPE: CPT

## 2020-03-31 PROCEDURE — 84484 ASSAY OF TROPONIN QUANT: CPT

## 2020-03-31 PROCEDURE — 83605 ASSAY OF LACTIC ACID: CPT

## 2020-03-31 PROCEDURE — 96372 THER/PROPH/DIAG INJ SC/IM: CPT

## 2020-03-31 PROCEDURE — 51701 INSERT BLADDER CATHETER: CPT

## 2020-03-31 PROCEDURE — 85007 BL SMEAR W/DIFF WBC COUNT: CPT

## 2020-03-31 PROCEDURE — 85025 COMPLETE CBC W/AUTO DIFF WBC: CPT

## 2020-03-31 PROCEDURE — 96361 HYDRATE IV INFUSION ADD-ON: CPT

## 2020-03-31 PROCEDURE — 82550 ASSAY OF CK (CPK): CPT

## 2020-03-31 PROCEDURE — 83690 ASSAY OF LIPASE: CPT

## 2020-03-31 PROCEDURE — 96375 TX/PRO/DX INJ NEW DRUG ADDON: CPT

## 2020-03-31 PROCEDURE — 96374 THER/PROPH/DIAG INJ IV PUSH: CPT

## 2020-03-31 PROCEDURE — 81025 URINE PREGNANCY TEST: CPT

## 2020-03-31 PROCEDURE — 99285 EMERGENCY DEPT VISIT HI MDM: CPT

## 2020-03-31 PROCEDURE — 80053 COMPREHEN METABOLIC PANEL: CPT

## 2020-03-31 PROCEDURE — 74177 CT ABD & PELVIS W/CONTRAST: CPT

## 2020-03-31 PROCEDURE — 36415 COLL VENOUS BLD VENIPUNCTURE: CPT

## 2020-03-31 NOTE — PHYS DOC
Past Medical History


Past Medical History:  Asthma, Other


Additional Past Medical Histor:  LUMP TO BILATERAL BREASTS


Past Surgical History:  Other


Additional Past Surgical Histo:  HERNIA REPAIR


Smoking Status:  Former Smoker


Alcohol Use:  None


Drug Use:  Marijuana





Adult General


Chief Complaint


Chief Complaint:  ALLERGIC REACTION





HPI


HPI





34-year-old female underlying history of asthma and anxiety presents the 

emergency department with multiple complaints.  Patient describes intermittent 

fever, cough, abdominal pain, bilateral lower extremity numbness, sweats, 

diarrhea.  Every reviews of systems is positive with her on examination.  She 

states her symptoms may have started a couple days ago have progressively gotten

worse.  Patient would not open her eyes and answer all my questions.  Nothing 

makes her pains worse nothing makes her pain better.





Review of Systems


Review of Systems





Constitutional: fever


HENT: congestion


Respiratory: Cough


Cardiovascular: No additional information not addressed in HPI []


GI: abdominal pain, nausea, vomiting, diarrhea []


: Denies dysuria or hematuria []


Musculoskeletal: Denies back pain or joint pain []


Integument: Denies rash or skin lesions []


Neurologic: + headache, no focal weakness or sensory changes []





All other systems were reviewed and found to be within normal limits, except as 

documented in this note.





Current Medications


Current Medications





Current Medications








 Medications


  (Trade)  Dose


 Ordered  Sig/Clement  Start Time


 Stop Time Status Last Admin


Dose Admin


 


 Dicyclomine HCl


  (Bentyl)  20 mg  1X  ONCE  3/31/20 21:30


 3/31/20 21:31 DC 3/31/20 21:24


20 MG


 


 Diphenhydramine


 HCl


  (Benadryl)  50 mg  1X  ONCE  3/31/20 21:30


 3/31/20 21:31 DC 3/31/20 21:23


50 MG


 


 Famotidine


  (Pepcid Vial)  20 mg  1X  ONCE  3/31/20 21:30


 3/31/20 21:31 DC 3/31/20 21:23


20 MG


 


 Iohexol


  (Omnipaque 300


 Mg/ml)  75 ml  1X  ONCE  3/31/20 20:00


 3/31/20 20:02 DC 3/31/20 20:11


75 ML


 


 Methylprednisolone


 Sodium Succinate


  (SOLU-Medrol


 125MG VIAL)  125 mg  1X  ONCE  3/31/20 21:30


 3/31/20 21:31 DC 3/31/20 21:24


125 MG


 


 Morphine Sulfate


  (Morphine


 Sulfate)  2 mg  1X  ONCE  3/31/20 19:30


 3/31/20 19:47 DC 3/31/20 19:59


2 MG


 


 Ondansetron HCl


  (Zofran)  4 mg  1X  ONCE  3/31/20 19:30


 3/31/20 19:47 DC 3/31/20 19:59


4 MG


 


 Potassium Chloride


  (Klor-Con)  40 meq  1X  ONCE  3/31/20 20:45


 3/31/20 20:46 DC 3/31/20 20:58


40 MEQ


 


 Sodium Chloride  1,000 ml @ 


 1,000 mls/hr  1X  ONCE  3/31/20 20:45


 3/31/20 21:44  3/31/20 20:58


1,000 MLS/HR











Allergies


Allergies





Allergies








Coded Allergies Type Severity Reaction Last Updated Verified


 


  morphine Adverse Reaction Mild Itching 3/31/20 Yes











Physical Exam


Physical Exam





Constitutional: Well developed, well nourished, no acute distress, non-toxic 

appearance. []


HENT: Normocephalic, atraumatic, bilateral external ears normal, oropharynx 

moist, no oral exudates, nose normal. []


Eyes: PERRLA, EOMI, conjunctiva normal, no discharge. [] 


Neck: Normal range of motion, no tenderness, supple, no stridor. [] 


Cardiovascular:Heart rate regular rhythm, no murmur []


Lungs & Thorax:  Bilateral breath sounds clear to auscultation []


Abdomen: Bowel sounds normal, soft, no tenderness, no masses, no pulsatile 

masses. [] 


Skin: Warm, dry, no erythema, no rash. [] 


Back: No tenderness, no CVA tenderness. [] 


Extremities: No tenderness, no cyanosis, no clubbing, ROM intact, no edema. [] 


Neurologic: Alert and oriented X 3, normal motor function, normal sensory 

function, no focal deficits noted. []


Psychologic: Affect normal, judgement normal, mood normal. []





Current Patient Data


Vital Signs





                                   Vital Signs








  Date Time  Temp Pulse Resp B/P (MAP) Pulse Ox O2 Delivery O2 Flow Rate FiO2


 


3/31/20 19:59   22   Room Air  


 


3/31/20 19:14 97.6 93  132/66 (88) 100   





 97.6       








Lab Values





                                Laboratory Tests








Test


 3/31/20


19:19 3/31/20


19:20 3/31/20


19:22 3/31/20


19:30


 


Urine Collection Type U cath     


 


Urine Color Yellow     


 


Urine Clarity Clear     


 


Urine pH


 6.0 (<5.0-8.0)


 


 


 





 


Urine Specific Gravity


 1.020


(1.000-1.030) 


 


 





 


Urine Protein


 Negative mg/dL


(NEG-TRACE) 


 


 





 


Urine Glucose (UA)


 Negative mg/dL


(NEG) 


 


 





 


Urine Ketones (Stick)


 Negative mg/dL


(NEG) 


 


 





 


Urine Blood


 Negative (NEG)


 


 


 





 


Urine Nitrite


 Negative (NEG)


 


 


 





 


Urine Bilirubin


 Negative (NEG)


 


 


 





 


Urine Urobilinogen Dipstick


 1.0 mg/dL (0.2


mg/dL) 


 


 





 


Urine Leukocyte Esterase


 Negative (NEG)


 


 


 





 


Urine RBC


 Occ /HPF (0-2)


 


 


 





 


Urine WBC 0 /HPF (0-4)     


 


Urine Squamous Epithelial


Cells Few /LPF  


 


 


 





 


Urine Bacteria


 0 /HPF (0-FEW)


 


 


 





 


Urine Mucus Marked /LPF     


 


White Blood Count


 


 7.2 x10^3/uL


(4.0-11.0) 


 





 


Red Blood Count


 


 4.31 x10^6/uL


(3.50-5.40) 


 





 


Hemoglobin


 


 8.3 g/dL


(12.0-15.5)  L 


 





 


Hematocrit


 


 26.3 %


(36.0-47.0)  L 


 





 


Mean Corpuscular Volume


 


 61 fL ()


L 


 





 


Mean Corpuscular Hemoglobin


 


 19 pg (25-35)


L 


 





 


Mean Corpuscular Hemoglobin


Concent 


 32 g/dL


(31-37) 


 





 


Red Cell Distribution Width


 


 19.0 %


(11.5-14.5)  H 


 





 


Platelet Count


 


 471 x10^3/uL


(140-400)  H 


 





 


Neutrophils (%) (Auto)  32 % (31-73)    


 


Lymphocytes (%) (Auto)  57 % (24-48)  H  


 


Monocytes (%) (Auto)  9 % (0-9)    


 


Eosinophils (%) (Auto)  1 % (0-3)    


 


Basophils (%) (Auto)  1 % (0-3)    


 


Neutrophils # (Auto)


 


 2.3 x10^3/uL


(1.8-7.7) 


 





 


Lymphocytes # (Auto)


 


 4.1 x10^3/uL


(1.0-4.8) 


 





 


Monocytes # (Auto)


 


 0.7 x10^3/uL


(0.0-1.1) 


 





 


Eosinophils # (Auto)


 


 0.1 x10^3/uL


(0.0-0.7) 


 





 


Basophils # (Auto)


 


 0.1 x10^3/uL


(0.0-0.2) 


 





 


Segmented Neutrophils %  34 % (35-66)  L  


 


Lymphocytes %  55 % (24-48)  H  


 


Monocytes %  11 % (0-10)  H  


 


Platelet Estimate


 


 Increased


(ADEQUATE) 


 





 


Polychromasia  Slight    


 


Hypochromasia  Mod    


 


Anisocytosis  Slight    


 


Microcytosis  Marked    


 


Sodium Level


 


 134 mmol/L


(136-145)  L 


 





 


Potassium Level


 


 3.3 mmol/L


(3.5-5.1)  L 


 





 


Chloride Level


 


 98 mmol/L


() 


 





 


Carbon Dioxide Level


 


 23 mmol/L


(21-32) 


 





 


Anion Gap  13 (6-14)    


 


Blood Urea Nitrogen


 


 7 mg/dL (7-20)


 


 





 


Creatinine


 


 0.8 mg/dL


(0.6-1.0) 


 





 


Estimated GFR


(Cockcroft-Gault) 


 98.2  


 


 





 


BUN/Creatinine Ratio  9 (6-20)    


 


Glucose Level


 


 151 mg/dL


(70-99)  H 


 





 


Calcium Level


 


 9.2 mg/dL


(8.5-10.1) 


 





 


Total Bilirubin


 


 0.1 mg/dL


(0.2-1.0)  L 


 





 


Aspartate Amino Transferase


(AST) 


 18 U/L (15-37)


 


 





 


Alanine Aminotransferase (ALT)


 


 27 U/L (14-59)


 


 





 


Alkaline Phosphatase


 


 88 U/L


() 


 





 


Creatine Kinase


 


 193 U/L


()  H 


 





 


Troponin I Quantitative


 


 < 0.017 ng/mL


(0.000-0.055) 


 





 


Total Protein


 


 7.4 g/dL


(6.4-8.2) 


 





 


Albumin


 


 3.4 g/dL


(3.4-5.0) 


 





 


Albumin/Globulin Ratio


 


 0.9 (1.0-1.7)


L 


 





 


Lipase


 


 70 U/L


()  L 


 





 


POC Urine HCG, Qualitative


 


 


 Hcg negative


(Negative) 





 


Lactic Acid Level


 


 


 


 2.3 mmol/L


(0.4-2.0)  H





                                Laboratory Tests


3/31/20 19:20








                                Laboratory Tests


3/31/20 19:20











EKG


EKG


[]





Radiology/Procedures


Radiology/Procedures


[facility]


                    [address] [place] [address] [phone]


                                        


                                 IMAGING REPORT





                                     Signed





PATIENT: BREANNA SALOMON   ACCOUNT: [id number]     MRN#: [id number]


: 1985           LOCATION: ER              AGE: 34


SEX: F                    EXAM DT: 20         ACCESSION#: [id number]


STATUS: REG ER            ORD. PHYSICIAN: KIARA BRAR MD


REASON: abdomial pain, fever, diarrhea


PROCEDURE: CT ABD PELV W/ IV CONTRST ONLY





CT scan of the abdomen and pelvis with contrast 3/31/2020


 


CLINICAL HISTORY: Abdominal pain, fever and diarrhea.


 


TECHNIQUE: After the intravenous administration 75 cc of Omnipaque 300, 


contiguous, 5 mm axial sections were obtained through the abdomen and 


pelvis.


 


One or more of the following individualized dose reduction techniques were


utilized for this study:


 


1. Automated exposure control.


2. Adjustment of the mA and/or kV according to patient size.


3. Use of iterative reconstruction technique.


 


 


FINDINGS: Images through the lung bases demonstrate several small nodular 


opacities involving both lower lobes. These measure 2 to 6 mm in size. 


These likely represent combination of areas of subsegmental atelectasis 


and noncalcified granulomas. Calcified left hilar lymph nodes are seen. A 


3 mm calcified granuloma is seen involving the left lower lobe.


 


The liver parenchyma has a decreased attenuation consistent with fatty 


infiltration. The spleen, pancreas, adrenal glands and kidneys are within 


normal limits.


 


The abdominal aorta tapers normally. The gallbladder is well-distended. No


free fluid or free air is seen within the abdomen. There is no evidence of


bowel obstruction. Air and stool are seen throughout the colon. The 


appendix is within normal limits.


 


Images through pelvis demonstrate the urinary bladder to be contracted. No


adnexal mass is seen. No free fluid is noted. Minimal S-shaped curvature 


of the thoracolumbar spine is seen.


 


IMPRESSION: No acute abnormality is seen.


 


Electronically signed by: Prashanth Lazar MD (3/31/2020 8:49 PM) [id number]














DICTATED and SIGNED BY:     PRASHANTH LAZAR MD


DATE:     20


[]





Course & Med Decision Making


Course & Med Decision Making


Pertinent Labs and Imaging studies reviewed. (See chart for details)





[]34-year-old female underlying history of asthma and anxiety presents the 

emergency department with multiple complaints.  Patient describes intermittent 

fever, cough, abdominal pain, bilateral lower extremity numbness, sweats, 

diarrhea.  Every reviews of systems is positive with her on examination.  She 

states her symptoms may have started a couple days ago have progressively gotten

 worse.  Patient would not open her eyes and answer all my questions.  Nothing 

makes her pains worse nothing makes her pain better.





Labs/Imaging reviewed


CT without evidence of acute process


Patient did have allergic reaction to morphine - benadryl, solumedrol, pepcid 

given with improvement


Bentyl IM - improved abdominal pain


IVF x 2 liters





Dragon Disclaimer


Dragon Disclaimer


This electronic medical record was generated, in whole or in part, using a voice

 recognition dictation system.





Departure


Departure


Impression:  


   Primary Impression:  


   Abdominal pain


   Additional Impressions:  


   Allergic reaction


   Insect bite


Disposition:   HOME, SELF-CARE


Condition:  IMPROVED


Referrals:  


NO PCP (PCP)


Patient Instructions:  Abdominal Pain (Nonspecific), Anaphylactic Reaction, 

Easy-to-Read





Additional Instructions:  


Recommend tylenol as needed for fever


urine and CT scan negative for acute process


You received bentyl in ER for abdominal pain - prescription provided


You did have allergic reaction to morphine - please add to your allergy list


No acute findings for infectious process


Benadryl as needed for itching


Scripts


Dicyclomine Hcl (DICYCLOMINE HCL) 10 Mg Capsule


1 CAP PO TID for 5 Days, #15 CAP 0 Refills


   Prov: KIARA BRAR MD         3/31/20 


Doxycycline Hyclate (DOXYCYCLINE HYCLATE) 100 Mg Capsule


1 CAP PO BID, #14 CAP


   Prov: KIARA BRAR MD         3/31/20 


Ondansetron Hcl (ZOFRAN) 4 Mg Tablet


1 TAB PO PRN Q6-8HRS for nausea, #12 TAB


   Prov: KIARA BRAR MD         3/31/20





Problem Qualifiers








   Primary Impression:  


   Abdominal pain


   Abdominal location:  generalized  Qualified Codes:  R10.84 - Generalized 

   abdominal pain


   Additional Impressions:  


   Allergic reaction


   Encounter type:  initial encounter  Qualified Codes:  T78.40XA - Allergy, 

   unspecified, initial encounter


   Insect bite


   Encounter type:  initial encounter  Site of insect bite:  unspecified site  

   Qualified Codes:  W57.XXXA - Bitten or stung by nonvenomous insect and other 

   nonvenomous arthropods, initial encounter








KIARA BRAR MD              Mar 31, 2020 19:24

## 2020-03-31 NOTE — RAD
CT scan of the abdomen and pelvis with contrast 3/31/2020

 

CLINICAL HISTORY: Abdominal pain, fever and diarrhea.

 

TECHNIQUE: After the intravenous administration 75 cc of Omnipaque 300, 

contiguous, 5 mm axial sections were obtained through the abdomen and 

pelvis.

 

One or more of the following individualized dose reduction techniques were

utilized for this study:

 

1. Automated exposure control.

2. Adjustment of the mA and/or kV according to patient size.

3. Use of iterative reconstruction technique.

 

 

FINDINGS: Images through the lung bases demonstrate several small nodular 

opacities involving both lower lobes. These measure 2 to 6 mm in size. 

These likely represent combination of areas of subsegmental atelectasis 

and noncalcified granulomas. Calcified left hilar lymph nodes are seen. A 

3 mm calcified granuloma is seen involving the left lower lobe.

 

The liver parenchyma has a decreased attenuation consistent with fatty 

infiltration. The spleen, pancreas, adrenal glands and kidneys are within 

normal limits.

 

The abdominal aorta tapers normally. The gallbladder is well-distended. No

free fluid or free air is seen within the abdomen. There is no evidence of

bowel obstruction. Air and stool are seen throughout the colon. The 

appendix is within normal limits.

 

Images through pelvis demonstrate the urinary bladder to be contracted. No

adnexal mass is seen. No free fluid is noted. Minimal S-shaped curvature 

of the thoracolumbar spine is seen.

 

IMPRESSION: No acute abnormality is seen.

 

Electronically signed by: Prashanth Cox MD (3/31/2020 8:49 PM) [id number]

## 2021-08-16 ENCOUNTER — HOSPITAL ENCOUNTER (EMERGENCY)
Dept: HOSPITAL 61 - ER | Age: 36
LOS: 1 days | Discharge: LEFT BEFORE DISCHARGE | End: 2021-08-17
Payer: COMMERCIAL

## 2021-08-16 DIAGNOSIS — L29.8: Primary | ICD-10-CM

## 2021-08-16 DIAGNOSIS — Z53.21: ICD-10-CM

## 2021-08-23 ENCOUNTER — HOSPITAL ENCOUNTER (EMERGENCY)
Dept: HOSPITAL 61 - ER | Age: 36
Discharge: HOME | End: 2021-08-23
Payer: COMMERCIAL

## 2021-08-23 VITALS — BODY MASS INDEX: 28.13 KG/M2 | HEIGHT: 60 IN | WEIGHT: 143.3 LBS

## 2021-08-23 VITALS — DIASTOLIC BLOOD PRESSURE: 66 MMHG | SYSTOLIC BLOOD PRESSURE: 113 MMHG

## 2021-08-23 DIAGNOSIS — F17.200: ICD-10-CM

## 2021-08-23 DIAGNOSIS — M54.6: ICD-10-CM

## 2021-08-23 DIAGNOSIS — J02.9: Primary | ICD-10-CM

## 2021-08-23 DIAGNOSIS — M54.5: ICD-10-CM

## 2021-08-23 DIAGNOSIS — J45.909: ICD-10-CM

## 2021-08-23 DIAGNOSIS — R05: ICD-10-CM

## 2021-08-23 DIAGNOSIS — Z20.822: ICD-10-CM

## 2021-08-23 DIAGNOSIS — Z88.5: ICD-10-CM

## 2021-08-23 PROCEDURE — U0003 INFECTIOUS AGENT DETECTION BY NUCLEIC ACID (DNA OR RNA); SEVERE ACUTE RESPIRATORY SYNDROME CORONAVIRUS 2 (SARS-COV-2) (CORONAVIRUS DISEASE [COVID-19]), AMPLIFIED PROBE TECHNIQUE, MAKING USE OF HIGH THROUGHPUT TECHNOLOGIES AS DESCRIBED BY CMS-2020-01-R: HCPCS

## 2021-08-23 PROCEDURE — 72128 CT CHEST SPINE W/O DYE: CPT

## 2021-08-23 PROCEDURE — 81025 URINE PREGNANCY TEST: CPT

## 2021-08-23 PROCEDURE — 71045 X-RAY EXAM CHEST 1 VIEW: CPT

## 2021-08-23 PROCEDURE — 72131 CT LUMBAR SPINE W/O DYE: CPT

## 2021-08-23 PROCEDURE — 87426 SARSCOV CORONAVIRUS AG IA: CPT

## 2021-08-23 PROCEDURE — 99285 EMERGENCY DEPT VISIT HI MDM: CPT

## 2021-08-23 NOTE — RAD
XR CHEST 1V



Clinical History: Reason: cough PUI / Spl. Instructions:  / History: 



Technique:  AP view of the chest was obtained at 8/23/2021 8:52 PM.



Comparison: None.



Findings:

The cardiomediastinal silhouette is normal. The pulmonary vasculature is normal. The lungs and pleura
l margins are clear.



Impression:  



No evidence of an acute cardiopulmonary process.



Electronically signed by: Vnace Velasquez III, MD (8/23/2021 9:32 PM) [facility]
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
## 2021-08-24 NOTE — NUR
IP: Attempted to contact pt concernig covid results. No answer, voicemail box full. Sent an 
SMS to return the call.